# Patient Record
Sex: MALE | Race: WHITE
[De-identification: names, ages, dates, MRNs, and addresses within clinical notes are randomized per-mention and may not be internally consistent; named-entity substitution may affect disease eponyms.]

---

## 2019-10-09 ENCOUNTER — HOSPITAL ENCOUNTER (OUTPATIENT)
Dept: HOSPITAL 19 - SDCO | Age: 49
Discharge: HOME | End: 2019-10-09
Attending: INTERNAL MEDICINE
Payer: COMMERCIAL

## 2019-10-09 VITALS — DIASTOLIC BLOOD PRESSURE: 69 MMHG | SYSTOLIC BLOOD PRESSURE: 106 MMHG | HEART RATE: 66 BPM

## 2019-10-09 VITALS — DIASTOLIC BLOOD PRESSURE: 71 MMHG | HEART RATE: 71 BPM | SYSTOLIC BLOOD PRESSURE: 104 MMHG

## 2019-10-09 VITALS — TEMPERATURE: 97.7 F | SYSTOLIC BLOOD PRESSURE: 104 MMHG | DIASTOLIC BLOOD PRESSURE: 79 MMHG | HEART RATE: 71 BPM

## 2019-10-09 VITALS — HEART RATE: 71 BPM | TEMPERATURE: 98 F | SYSTOLIC BLOOD PRESSURE: 146 MMHG | DIASTOLIC BLOOD PRESSURE: 85 MMHG

## 2019-10-09 VITALS — SYSTOLIC BLOOD PRESSURE: 114 MMHG | HEART RATE: 59 BPM | DIASTOLIC BLOOD PRESSURE: 80 MMHG

## 2019-10-09 VITALS — WEIGHT: 263.67 LBS | BODY MASS INDEX: 37.75 KG/M2 | HEIGHT: 70 IN

## 2019-10-09 DIAGNOSIS — K21.9: ICD-10-CM

## 2019-10-09 DIAGNOSIS — D12.4: ICD-10-CM

## 2019-10-09 DIAGNOSIS — D12.8: ICD-10-CM

## 2019-10-09 DIAGNOSIS — K59.00: ICD-10-CM

## 2019-10-09 DIAGNOSIS — D12.0: Primary | ICD-10-CM

## 2019-10-09 DIAGNOSIS — Z98.52: ICD-10-CM

## 2019-10-09 DIAGNOSIS — Z87.891: ICD-10-CM

## 2019-10-09 DIAGNOSIS — D12.2: ICD-10-CM

## 2019-10-09 NOTE — NUR
Pt arrives from endo procedure via cart.  Pt ambulates from cart to recliner
with RN assist.  Monitors on and alarms set.  Call light within reach.  Report
received from SILVERIO Mcghee.  Pt denies pain or nausea or other complaints.  Pt
requests muffin and ice water.  Pt alert and oriented.

## 2019-10-09 NOTE — NUR
Discharge instructions given to patient and wife.  All questions answered to
their satisfaction.  Handed to them are a thank you card, discharge
instructions, diagnosis information, procedural photos, and a discharge med
sheet.

## 2023-02-08 ENCOUNTER — HOSPITAL ENCOUNTER (OUTPATIENT)
Dept: HOSPITAL 19 - SDCO | Age: 53
Discharge: HOME | End: 2023-02-08
Attending: INTERNAL MEDICINE
Payer: COMMERCIAL

## 2023-02-08 VITALS — HEART RATE: 50 BPM | SYSTOLIC BLOOD PRESSURE: 102 MMHG | DIASTOLIC BLOOD PRESSURE: 65 MMHG

## 2023-02-08 VITALS — SYSTOLIC BLOOD PRESSURE: 124 MMHG | DIASTOLIC BLOOD PRESSURE: 67 MMHG | HEART RATE: 54 BPM | TEMPERATURE: 97.6 F

## 2023-02-08 VITALS — HEART RATE: 50 BPM | DIASTOLIC BLOOD PRESSURE: 58 MMHG | TEMPERATURE: 97.3 F | SYSTOLIC BLOOD PRESSURE: 95 MMHG

## 2023-02-08 VITALS — SYSTOLIC BLOOD PRESSURE: 112 MMHG | HEART RATE: 55 BPM | DIASTOLIC BLOOD PRESSURE: 69 MMHG

## 2023-02-08 VITALS — HEART RATE: 52 BPM | SYSTOLIC BLOOD PRESSURE: 105 MMHG | DIASTOLIC BLOOD PRESSURE: 72 MMHG

## 2023-02-08 VITALS — WEIGHT: 219.8 LBS | BODY MASS INDEX: 31.47 KG/M2 | HEIGHT: 70 IN

## 2023-02-08 DIAGNOSIS — Z12.11: Primary | ICD-10-CM

## 2023-02-08 DIAGNOSIS — K63.5: ICD-10-CM

## 2023-02-08 DIAGNOSIS — K64.0: ICD-10-CM

## 2023-02-08 NOTE — NUR
PATIENT AMBULATED TO CHAIR WITH STANDBY ASSIST. PATIENT ALERT AND ORIENTED,
DENIES PAIN AND NAUSEA. BREATHING REGULAR AND UNLABORED. SEE CHART FOR VITAL
SIGNS. NURSE HANDOFF COMPLETED IN ROOM. PATIENT HAD CRANBERRY JUICE AND
APPLESAUCE, BOTH TOLERATED WELL.  MET WITH PATIENT IN ROOM TO
DISCUSS PROCEDURE. CALL LIGHT IN REACH.

## 2023-02-08 NOTE — NUR
PATIENT DENIES PAIN AND NAUSEA. DISCHARGE TEACHING COMPLETED WITH PRINTED
EDUCATION AND INSTRUCTIONS SENT HOME WITH PATIENT. PATIENT VERBALIZED
UNDERSTANDING OF TEACHING. IV REMOVED. PATIENT DISCHARGED HOME WITH FRIEND,
BARRY, AS TRANSPORT.

## 2024-10-11 ENCOUNTER — HOSPITAL ENCOUNTER (OUTPATIENT)
Dept: HOSPITAL 19 - SDCO | Age: 54
Discharge: HOME | End: 2024-10-11
Attending: SURGERY
Payer: COMMERCIAL

## 2024-10-11 VITALS — HEART RATE: 52 BPM | DIASTOLIC BLOOD PRESSURE: 62 MMHG | SYSTOLIC BLOOD PRESSURE: 102 MMHG

## 2024-10-11 VITALS — WEIGHT: 233.25 LBS | HEIGHT: 70 IN | BODY MASS INDEX: 33.39 KG/M2

## 2024-10-11 VITALS — SYSTOLIC BLOOD PRESSURE: 109 MMHG | HEART RATE: 55 BPM | DIASTOLIC BLOOD PRESSURE: 55 MMHG | TEMPERATURE: 97.6 F

## 2024-10-11 VITALS — TEMPERATURE: 98 F | HEART RATE: 53 BPM | SYSTOLIC BLOOD PRESSURE: 106 MMHG | DIASTOLIC BLOOD PRESSURE: 59 MMHG

## 2024-10-11 VITALS — HEART RATE: 51 BPM | TEMPERATURE: 97 F | DIASTOLIC BLOOD PRESSURE: 64 MMHG | SYSTOLIC BLOOD PRESSURE: 96 MMHG

## 2024-10-11 VITALS — SYSTOLIC BLOOD PRESSURE: 108 MMHG | DIASTOLIC BLOOD PRESSURE: 54 MMHG | HEART RATE: 57 BPM

## 2024-10-11 DIAGNOSIS — E66.9: ICD-10-CM

## 2024-10-11 DIAGNOSIS — L05.91: Primary | ICD-10-CM

## 2024-10-11 NOTE — NUR
1120-PT TO BAY 6 PER CART FROM PACU.  REPORT RECEIVED.  VS OBTAINED.  CALL
LIGHT WITHIN REACH.  PT DENIES ANY NEEDS AT THIS TIME.
 
1125-PT TOLERATING WATER AND MUFFINS.
 
1135-PT DENIES ANY NEEDS.  NO CHANGE.
 
1155-IV DC'D AT THIS TIME.
 
1200-DISCHARGE EDUCATION COMPLETED WITH PT AND HIS WIFE.  HAWK DRAIN EDUCATION
PROVIDED AT THIS TIME WITH NECESSARY SUPPLIES FOR HOME.  VERBALIZED
UNDERSTANDING OF HOME AND FOLLOW UP CARE.  ALL QUESTIONS ANSWERED.  DISCHARGE
PAPERWORK GIVEN TO PT.
 
1205-PT ABLE TO DRESS SELF WITH ASSISTANCE OF HIS WIFE.
 
1210-PT OFF UNIT PER WHEELCHAIR.  PT DISCHARGED TO HOME WITH HIS WIFE PER
PERSONAL VEHICLE.